# Patient Record
Sex: MALE | Race: WHITE | NOT HISPANIC OR LATINO | Employment: FULL TIME | URBAN - METROPOLITAN AREA
[De-identification: names, ages, dates, MRNs, and addresses within clinical notes are randomized per-mention and may not be internally consistent; named-entity substitution may affect disease eponyms.]

---

## 2019-03-12 ENCOUNTER — ALLIED HEALTH/NURSE VISIT (OUTPATIENT)
Dept: NURSING | Facility: CLINIC | Age: 29
End: 2019-03-12
Payer: COMMERCIAL

## 2019-03-12 DIAGNOSIS — Z53.9 DIAGNOSIS NOT YET DEFINED: Primary | ICD-10-CM

## 2019-03-12 NOTE — PROGRESS NOTES
SUBJECTIVE:   Yash Russell is a 28 year old male who presents to clinic today for the following health issues:    Travel immunizations.    After reviewing the patient history and consulting Dr. Roche. He advise that Vanna contact the travel clinics.   [facility] and [facility] number was provided to the patient.  No immunization administer today.        Screening performed by Cornelia Peck 3/12/2019 at 5:37 PM.

## 2019-09-18 ENCOUNTER — OFFICE VISIT (OUTPATIENT)
Dept: FAMILY MEDICINE | Facility: CLINIC | Age: 29
End: 2019-09-18
Payer: COMMERCIAL

## 2019-09-18 VITALS
WEIGHT: 194 LBS | HEART RATE: 72 BPM | SYSTOLIC BLOOD PRESSURE: 122 MMHG | DIASTOLIC BLOOD PRESSURE: 68 MMHG | BODY MASS INDEX: 23.62 KG/M2 | TEMPERATURE: 98.2 F | HEIGHT: 76 IN

## 2019-09-18 DIAGNOSIS — I82.621 DEEP VEIN THROMBOSIS (DVT) OF OTHER VEIN OF RIGHT UPPER EXTREMITY, UNSPECIFIED CHRONICITY (H): ICD-10-CM

## 2019-09-18 DIAGNOSIS — Z13.1 SCREENING FOR DIABETES MELLITUS: ICD-10-CM

## 2019-09-18 DIAGNOSIS — Z13.220 LIPID SCREENING: ICD-10-CM

## 2019-09-18 DIAGNOSIS — Z00.00 ROUTINE GENERAL MEDICAL EXAMINATION AT A HEALTH CARE FACILITY: Primary | ICD-10-CM

## 2019-09-18 DIAGNOSIS — H91.91 HEARING PROBLEM OF RIGHT EAR: ICD-10-CM

## 2019-09-18 PROCEDURE — 90471 IMMUNIZATION ADMIN: CPT | Performed by: FAMILY MEDICINE

## 2019-09-18 PROCEDURE — 99213 OFFICE O/P EST LOW 20 MIN: CPT | Mod: 25 | Performed by: FAMILY MEDICINE

## 2019-09-18 PROCEDURE — 99385 PREV VISIT NEW AGE 18-39: CPT | Mod: 25 | Performed by: FAMILY MEDICINE

## 2019-09-18 PROCEDURE — 90715 TDAP VACCINE 7 YRS/> IM: CPT | Performed by: FAMILY MEDICINE

## 2019-09-18 ASSESSMENT — ENCOUNTER SYMPTOMS
EYE PAIN: 0
HEADACHES: 0
WEAKNESS: 0
JOINT SWELLING: 0
FEVER: 0
DIARRHEA: 0
PALPITATIONS: 0
NERVOUS/ANXIOUS: 0
HEARTBURN: 0
SORE THROAT: 0
SHORTNESS OF BREATH: 0
FREQUENCY: 0
HEMATURIA: 0
NAUSEA: 0
DYSURIA: 0
HEMATOCHEZIA: 0
CONSTIPATION: 0
PARESTHESIAS: 0
MYALGIAS: 0
ARTHRALGIAS: 0
DIZZINESS: 0
ABDOMINAL PAIN: 0
CHILLS: 0
COUGH: 0

## 2019-09-18 ASSESSMENT — MIFFLIN-ST. JEOR: SCORE: 1937.51

## 2019-09-18 NOTE — PATIENT INSTRUCTIONS
Remember to follow up with the pharmacy in late September or early October to get your flu vaccine.    Tell your obstetrician-gynecologist that ou have gotten your TDAP today    Please schedule a follow-up appointment to get your FASTING blood work done in the near future. Please make sure you fast anywhere between 8-12 hours prior to your scheduled appointment    If hearing difficulties persist or worsen after about a month, follow-up with ENT or audiology. I have placed a referral for this.    If you have any follow-up questions or concerns regarding any of the topics discussed during today s visit, please feel free to get in contact with us. Your wellbeing is our priority :)      Preventive Health Recommendations  Male Ages 26 - 39    Yearly exam:             See your health care provider every year in order to  o   Review health changes.   o   Discuss preventive care.    o   Review your medicines if your doctor has prescribed any.    You should be tested each year for STDs (sexually transmitted diseases), if you re at risk.     After age 35, talk to your provider about cholesterol testing. If you are at risk for heart disease, have your cholesterol tested at least every 5 years.     If you are at risk for diabetes, you should have a diabetes test (fasting glucose).  Shots: Get a flu shot each year. Get a tetanus shot every 10 years.     Nutrition:    Eat at least 5 servings of fruits and vegetables daily.     Eat whole-grain bread, whole-wheat pasta and brown rice instead of white grains and rice.     Get adequate Calcium and Vitamin D.     Lifestyle    Exercise for at least 150 minutes a week (30 minutes a day, 5 days a week). This will help you control your weight and prevent disease.     Limit alcohol to one drink per day.     No smoking.     Wear sunscreen to prevent skin cancer.     See your dentist every six months for an exam and cleaning.

## 2019-09-18 NOTE — PROGRESS NOTES
SUBJECTIVE:   CC: Yash Russell is an 29 year old male who presents for preventative health visit.     Healthy Habits:     Getting at least 3 servings of Calcium per day:  NO    Bi-annual eye exam:  NO    Dental care twice a year:  Yes    Sleep apnea or symptoms of sleep apnea:  None    Diet:  Breakfast skipped    Frequency of exercise:  4-5 days/week    Duration of exercise:  30-45 minutes    Taking medications regularly:  Yes    Medication side effects:  None    PHQ-2 Total Score: 0    Additional concerns today:  No    TDAP updated today     Surgical history   Had first rib on the right side removed in the past due to blood clot. Was on blood thinners (pradaxa for 9 months ) for a bit.  Has a stent placed (2011) . He says he is currently asystematic.   Denies a family history of clots     Difficulty Hearing  Has hearing difficulty in right ear. He says he often misses a word or two.    Additional info  Smokes a cigar 1-2 a year  He says he is active. Denies chest pain on exertion or shortness of breath  cholesterol checked in 2013 and results were normal  Denies testicular tenderness, masses, or lesions.        Today's PHQ-2 Score:   PHQ-2 ( 1999 Pfizer) 9/18/2019   Q1: Little interest or pleasure in doing things 0   Q2: Feeling down, depressed or hopeless 0   PHQ-2 Score 0   Q1: Little interest or pleasure in doing things Not at all   Q2: Feeling down, depressed or hopeless Not at all   PHQ-2 Score 0       Abuse: Current or Past(Physical, Sexual or Emotional)- No  Do you feel safe in your environment? Yes    Social History     Tobacco Use     Smoking status: Never Smoker     Smokeless tobacco: Never Used   Substance Use Topics     Alcohol use: Yes     Comment: occasional         Alcohol Use 9/18/2019   Prescreen: >3 drinks/day or >7 drinks/week? No   Prescreen: >3 drinks/day or >7 drinks/week? -   No flowsheet data found.    Last PSA: No results found for: PSA    Reviewed orders with patient. Reviewed health  maintenance and updated orders accordingly - Yes  Labs reviewed in EPIC  BP Readings from Last 3 Encounters:   09/18/19 122/68   12/22/15 130/78   06/13/13 120/70    Wt Readings from Last 3 Encounters:   09/18/19 88 kg (194 lb)   12/22/15 89.8 kg (198 lb)   06/13/13 89.9 kg (198 lb 3.2 oz)                    Reviewed and updated as needed this visit by clinical staff  Tobacco  Allergies  Meds  Med Hx  Surg Hx  Fam Hx  Soc Hx        Reviewed and updated as needed this visit by Provider        Past Medical History:   Diagnosis Date     NO ACTIVE PROBLEMS      Thoracic outlet syndrome     first RIb gone on the right side-due to blood clot, 2011-stent in subclavian. treated with pradaxa for 9 months      Past Surgical History:   Procedure Laterality Date     SURGICAL HISTORY OF -       Tonsillectomy       Review of Systems   Constitutional: Negative for chills and fever.   HENT: Negative for congestion, ear pain, hearing loss and sore throat.    Eyes: Negative for pain and visual disturbance.   Respiratory: Negative for cough and shortness of breath.    Cardiovascular: Negative for chest pain, palpitations and peripheral edema.   Gastrointestinal: Negative for abdominal pain, constipation, diarrhea, heartburn, hematochezia and nausea.   Genitourinary: Negative for discharge, dysuria, frequency, genital sores, hematuria, impotence and urgency.   Musculoskeletal: Negative for arthralgias, joint swelling and myalgias.   Skin: Negative for rash.   Neurological: Negative for dizziness, weakness, headaches and paresthesias.   Psychiatric/Behavioral: Negative for mood changes. The patient is not nervous/anxious.      This document serves as a record of the services and decisions personally performed and made by Jens Islas DO. It was created on her behalf by Aranza Lundberg, a trained medical scribe. The creation of this document is based on the provider's statements to the medical scribe.  Aranza Lundberg 4:54 PM  "September 18, 2019      OBJECTIVE:   /68   Pulse 72   Temp 98.2  F (36.8  C) (Oral)   Ht 1.924 m (6' 3.75\")   Wt 88 kg (194 lb)   BMI 23.77 kg/m      Physical Exam  GENERAL: healthy, alert and no distress  EYES: Eyes grossly normal to inspection, PERRL and conjunctivae and sclerae normal  HENT: Fluid behind right ear  RESP: lungs clear to auscultation - no rales, rhonchi or wheezes  CV: regular rate and rhythm, normal S1 S2, no S3 or S4, no murmur, click or rub, no peripheral edema and peripheral pulses strong  ABDOMEN: soft, nontender, no hepatosplenomegaly, no masses and bowel sounds normal  MS: no gross musculoskeletal defects noted, no edema  SKIN: well healed surgical scar under axilla , no suspicious lesions or rashes  NEURO: Normal strength and tone, mentation intact and speech normal  PSYCH: mentation appears normal, affect normal/bright    Diagnostic Test Results:  Labs reviewed in Epic  No results found for this or any previous visit (from the past 24 hour(s)).    ASSESSMENT/PLAN:       ICD-10-CM    1. Routine general medical examination at a health care facility Z00.00 **Hemoglobin FUTURE anytime   2. Deep vein thrombosis (DVT) of other vein of right upper extremity, unspecified chronicity (H) I82.621 OFFICE/OUTPT VISIT,EST,LEVL III   3. Hearing problem of right ear H91.91 AUDIOLOGY ADULT REFERRAL     OTOLARYNGOLOGY REFERRAL     OFFICE/OUTPT VISIT,EST,LEVL III   4. Lipid screening Z13.220 Lipid panel reflex to direct LDL Fasting   5. Screening for diabetes mellitus Z13.1 **Basic metabolic panel FUTURE anytime         1. Routine general medical examination at a health care facility  Today's routine screening physical exam showed normal findings with the exception of  fluids behind right ear.    2. Deep vein thrombosis (DVT) of other vein of right upper extremity, unspecified chronicity (H)  S/P stent and treatment. Stable. No family history of clots. Had procedure done in 2011.     3. Hearing " "problem of right ear-trial of decongestants for now.   New, If symptoms persist or worsen, follow-up with ENT or audiology.   - AUDIOLOGY ADULT REFERRAL  - OTOLARYNGOLOGY REFERRAL    Wife delivering soon, tdap updated    COUNSELING:   Reviewed preventive health counseling, as reflected in patient instructions  Special attention given to:        Regular exercise       Healthy diet/nutrition       Immunizations    Vaccinated for: TDAP             Family planning    Estimated body mass index is 23.77 kg/m  as calculated from the following:    Height as of this encounter: 1.924 m (6' 3.75\").    Weight as of this encounter: 88 kg (194 lb).          reports that he has never smoked. He has never used smokeless tobacco.      Counseling Resources:  ATP IV Guidelines  Pooled Cohorts Equation Calculator  FRAX Risk Assessment  ICSI Preventive Guidelines  Dietary Guidelines for Americans, 2010  USDA's MyPlate  ASA Prophylaxis  Lung CA Screening     The information in this document, created by the medical scribe for me, accurately reflects the services I personally performed and the decisions made by me. I have reviewed and approved this document for accuracy prior to leaving the patient care area.  September 18, 2019 4:59 PM      Jens Islas DO  [facility]  "

## 2019-10-01 ENCOUNTER — HEALTH MAINTENANCE LETTER (OUTPATIENT)
Age: 29
End: 2019-10-01

## 2021-01-15 ENCOUNTER — HEALTH MAINTENANCE LETTER (OUTPATIENT)
Age: 31
End: 2021-01-15

## 2021-09-04 ENCOUNTER — HEALTH MAINTENANCE LETTER (OUTPATIENT)
Age: 31
End: 2021-09-04

## 2022-02-19 ENCOUNTER — HEALTH MAINTENANCE LETTER (OUTPATIENT)
Age: 32
End: 2022-02-19

## 2022-05-26 ENCOUNTER — VIRTUAL VISIT (OUTPATIENT)
Dept: FAMILY MEDICINE | Facility: CLINIC | Age: 32
End: 2022-05-26
Payer: COMMERCIAL

## 2022-05-26 DIAGNOSIS — Z11.59 NEED FOR HEPATITIS C SCREENING TEST: Primary | ICD-10-CM

## 2022-05-26 DIAGNOSIS — R50.9 FEVER, UNSPECIFIED FEVER CAUSE: ICD-10-CM

## 2022-05-26 PROCEDURE — 99213 OFFICE O/P EST LOW 20 MIN: CPT | Mod: 95 | Performed by: FAMILY MEDICINE

## 2022-05-26 NOTE — PROGRESS NOTES
Yash is a 31 year old who is being evaluated via a billable video visit.      How would you like to obtain your AVS? MyChart  If the video visit is dropped, the invitation should be resent by: Text to cell phone: [phone]  Will anyone else be joining your video visit? No    Video Start Time: 11:15 AM    Assessment & Plan       Fever, unspecified fever cause  The patient has typical COVID symptoms.  I want him to stay out of work for a full 5 days and then return day 6 and asked for the next 10.  Therefore I do not recommend he go back to work tomorrow but Monday instead.  I have ordered testing for COVID and influenza  - Symptomatic; Yes; 5/23/2022 COVID-19 Virus (Coronavirus) by PCR  - Influenza A/B antigen      20 minutes spent on the date of the encounter doing chart review, history and exam, documentation and further activities per the note        No follow-ups on file.    Marycarmen Samuel DO  [facility]   Yash is a 31 year old who presents for the following health issues     HPI     Would like letter emailed to: [email or web address]  Missed work Tues, Wed, Thurs-- would like to return tomorrow if possible    Acute Illness  Acute illness concerns: Cold symptoms  Onset/Duration:  Monday (4 days)  Symptoms:  Fever: yes   Chills/Sweats: yes  Headache (location?): YES  Sinus Pressure: YES  Conjunctivitis:  no  Ear Pain: no  Rhinorrhea: YES  Congestion: YES  Sore Throat: YES  Cough: YES-productive of clear sputum  Wheeze: YES after coughing fit  Decreased Appetite: no  Nausea: no  Vomiting: no  Diarrhea: no  Dysuria/Freq.: no  Dysuria or Hematuria: no  Fatigue/Achiness: YES  Sick/Strep Exposure: YES- whole family is sick with viral colds  Therapies tried and outcome:  OTC meds-- negative home covid test yesterday    Kids ages 2 and 1 with cough and cold.  Baby slept 16 hours last night.  The kids had fevers up to 103.   Wife cough, nasal congestion and fever     Review of  Systems   Constitutional, HEENT, cardiovascular, pulmonary, gi and gu systems are negative, except as otherwise noted.      Objective           Vitals:  No vitals were obtained today due to virtual visit.    Physical Exam   GENERAL: alert, no distress and fatigued  EYES: Eyes grossly normal to inspection.  No discharge or erythema, or obvious scleral/conjunctival abnormalities.  RESP: No audible wheeze, cough, or visible cyanosis.  No visible retractions or increased work of breathing.    SKIN: Visible skin clear. No significant rash, abnormal pigmentation or lesions.  NEURO: Cranial nerves grossly intact.  Mentation and speech appropriate for age.  PSYCH: Mentation appears normal, affect normal/bright, judgement and insight intact, normal speech and appearance well-groomed.            Video-Visit Details    Type of service:  Video Visit    Video End Time:11:30 AM    Originating Location (pt. Location): Home    Distant Location (provider location):  [facility]     Platform used for Video Visit: LoopMe

## 2022-05-26 NOTE — LETTER
[facility]  [address] [address]  Phone: [phone]    May 26, 2022        Yash Russell  [address] [address]          To whom it may concern:    RE: Yash Russell    Patient was seen and treated today at our clinic and missed work May 23 through May 27, 2022.  He is okay to return to work May 30.    Please contact me for questions or concerns.      Sincerely,          Marycarmen Samuel, DO

## 2022-10-16 ENCOUNTER — HEALTH MAINTENANCE LETTER (OUTPATIENT)
Age: 32
End: 2022-10-16

## 2022-12-21 ASSESSMENT — ENCOUNTER SYMPTOMS
NERVOUS/ANXIOUS: 0
JOINT SWELLING: 0
SORE THROAT: 0
ARTHRALGIAS: 0
MYALGIAS: 0
FEVER: 0
NAUSEA: 0
ABDOMINAL PAIN: 0
WEAKNESS: 0
CONSTIPATION: 0
HEMATOCHEZIA: 0
DYSURIA: 0
HEMATURIA: 0
EYE PAIN: 0
DIARRHEA: 0
CHILLS: 0
HEARTBURN: 0
FREQUENCY: 0
PARESTHESIAS: 0
HEADACHES: 0
COUGH: 0
DIZZINESS: 0
SHORTNESS OF BREATH: 0
PALPITATIONS: 0

## 2022-12-28 ENCOUNTER — OFFICE VISIT (OUTPATIENT)
Dept: FAMILY MEDICINE | Facility: CLINIC | Age: 32
End: 2022-12-28
Payer: COMMERCIAL

## 2022-12-28 VITALS
WEIGHT: 202.2 LBS | TEMPERATURE: 98 F | HEART RATE: 76 BPM | SYSTOLIC BLOOD PRESSURE: 127 MMHG | OXYGEN SATURATION: 98 % | HEIGHT: 75 IN | BODY MASS INDEX: 25.14 KG/M2 | RESPIRATION RATE: 14 BRPM | DIASTOLIC BLOOD PRESSURE: 80 MMHG

## 2022-12-28 DIAGNOSIS — Z23 NEED FOR IMMUNIZATION AGAINST INFLUENZA: ICD-10-CM

## 2022-12-28 DIAGNOSIS — Z23 HIGH PRIORITY FOR 2019-NCOV VACCINE: ICD-10-CM

## 2022-12-28 DIAGNOSIS — Z11.59 NEED FOR HEPATITIS C SCREENING TEST: ICD-10-CM

## 2022-12-28 DIAGNOSIS — Z00.00 ROUTINE GENERAL MEDICAL EXAMINATION AT A HEALTH CARE FACILITY: Primary | ICD-10-CM

## 2022-12-28 DIAGNOSIS — R73.09 ELEVATED GLUCOSE: ICD-10-CM

## 2022-12-28 DIAGNOSIS — G54.0 THORACIC OUTLET SYNDROME: ICD-10-CM

## 2022-12-28 DIAGNOSIS — Z13.6 CARDIOVASCULAR SCREENING; LDL GOAL LESS THAN 160: ICD-10-CM

## 2022-12-28 PROCEDURE — 0134A COVID-19 VACCINE BIVALENT BOOSTER 18+ (MODERNA): CPT | Performed by: NURSE PRACTITIONER

## 2022-12-28 PROCEDURE — 91313 COVID-19 VACCINE BIVALENT BOOSTER 18+ (MODERNA): CPT | Performed by: NURSE PRACTITIONER

## 2022-12-28 PROCEDURE — 82947 ASSAY GLUCOSE BLOOD QUANT: CPT | Performed by: NURSE PRACTITIONER

## 2022-12-28 PROCEDURE — 80061 LIPID PANEL: CPT | Performed by: NURSE PRACTITIONER

## 2022-12-28 PROCEDURE — 90686 IIV4 VACC NO PRSV 0.5 ML IM: CPT | Performed by: NURSE PRACTITIONER

## 2022-12-28 PROCEDURE — 36415 COLL VENOUS BLD VENIPUNCTURE: CPT | Performed by: NURSE PRACTITIONER

## 2022-12-28 PROCEDURE — 99395 PREV VISIT EST AGE 18-39: CPT | Mod: 25 | Performed by: NURSE PRACTITIONER

## 2022-12-28 PROCEDURE — 90471 IMMUNIZATION ADMIN: CPT | Performed by: NURSE PRACTITIONER

## 2022-12-28 PROCEDURE — 86803 HEPATITIS C AB TEST: CPT | Performed by: NURSE PRACTITIONER

## 2022-12-28 RX ORDER — CX-024414 0.2 MG/ML
INJECTION, SUSPENSION INTRAMUSCULAR
COMMUNITY
Start: 2022-01-04

## 2022-12-28 ASSESSMENT — ENCOUNTER SYMPTOMS
SHORTNESS OF BREATH: 0
NAUSEA: 0
SORE THROAT: 0
ABDOMINAL PAIN: 0
FEVER: 0
PARESTHESIAS: 0
FREQUENCY: 0
WEAKNESS: 0
EYE PAIN: 0
CHILLS: 0
HEMATOCHEZIA: 0
DIARRHEA: 0
DIZZINESS: 0
COUGH: 0
MYALGIAS: 0
DYSURIA: 0
NERVOUS/ANXIOUS: 0
HEARTBURN: 0
PALPITATIONS: 0
ARTHRALGIAS: 0
JOINT SWELLING: 0
CONSTIPATION: 0
HEADACHES: 0
HEMATURIA: 0

## 2022-12-28 ASSESSMENT — PAIN SCALES - GENERAL: PAINLEVEL: NO PAIN (0)

## 2022-12-28 NOTE — PROGRESS NOTES
SUBJECTIVE:   CC: Yash is an 32 year old who presents for preventative health visit.   Patient has been advised of split billing requirements and indicates understanding: Yes     Patient is a  [facility], has not had health care for about 9 years  And has decided to take his health more seriously now that he has children.    Healthy Habits:     Getting at least 3 servings of Calcium per day:  Yes    Bi-annual eye exam:  NO    Dental care twice a year:  Yes    Sleep apnea or symptoms of sleep apnea:  None    Diet:  Regular (no restrictions)    Duration of exercise:  N/A    Taking medications regularly:  Not Applicable    Medication side effects:  Not applicable    PHQ-2 Total Score: 0    Additional concerns today:  No        Today's PHQ-2 Score:   PHQ-2 ( 1999 Pfizer) 12/21/2022   Q1: Little interest or pleasure in doing things 0   Q2: Feeling down, depressed or hopeless 0   PHQ-2 Score 0   PHQ-2 Total Score (12-17 Years)- Positive if 3 or more points; Administer PHQ-A if positive -   Q1: Little interest or pleasure in doing things Not at all   Q2: Feeling down, depressed or hopeless Not at all   PHQ-2 Score 0       Have you ever done Advance Care Planning? (For example, a Health Directive, POLST, or a discussion with a medical provider or your loved ones about your wishes): Yes, patient states has an Advance Care Planning document and will bring a copy to the clinic.    Social History     Tobacco Use     Smoking status: Never     Smokeless tobacco: Never   Substance Use Topics     Alcohol use: Yes     Comment: occasional     If you drink alcohol do you typically have >3 drinks per day or >7 drinks per week? No    Alcohol Use 12/28/2022   Prescreen: >3 drinks/day or >7 drinks/week? -   Prescreen: >3 drinks/day or >7 drinks/week? No       Last PSA: No results found for: PSA    Reviewed orders with patient. Reviewed health maintenance and updated orders accordingly - Yes  Labs reviewed in EPIC  BP  Readings from Last 3 Encounters:   12/28/22 127/80   09/18/19 122/68   12/22/15 130/78    Wt Readings from Last 3 Encounters:   12/28/22 91.7 kg (202 lb 3.2 oz)   09/18/19 88 kg (194 lb)   12/22/15 89.8 kg (198 lb)                  Patient Active Problem List   Diagnosis     CARDIOVASCULAR SCREENING; LDL GOAL LESS THAN 160     Gilbert syndrome     RT Thoracic outlet syndrome s/p surgical release     RT Subclavian DVT secondary to thoracic outlet     24 hour clinic contact list given     Past Surgical History:   Procedure Laterality Date     SURGICAL HISTORY OF -       Tonsillectomy     THORACIC SURGERY  2010    First rib removed right side       Social History     Tobacco Use     Smoking status: Never     Smokeless tobacco: Never   Substance Use Topics     Alcohol use: Yes     Comment: occasional     Family History   Problem Relation Age of Onset     Hyperlipidemia Paternal Grandfather      Coronary Artery Disease Paternal Grandfather      Family History Negative No family hx of      Asthma No family hx of      Cerebrovascular Disease No family hx of      Colon Cancer No family hx of      Hypertension No family hx of      Thyroid Disease No family hx of      Osteoporosis No family hx of            Reviewed and updated as needed this visit by clinical staff   Tobacco  Allergies  Meds     [facility] Hx          Reviewed and updated as needed this visit by Provider         [facility] Hx         Past Medical History:   Diagnosis Date     NO ACTIVE PROBLEMS      Thoracic outlet syndrome     first RIb gone on the right side-due to blood clot, 2011-stent in subclavian. treated with pradaxa for 9 months      Past Surgical History:   Procedure Laterality Date     SURGICAL HISTORY OF -       Tonsillectomy     THORACIC SURGERY  2010    First rib removed right side       Review of Systems   Constitutional: Negative for chills and fever.   HENT: Negative for congestion, ear pain, hearing loss and sore throat.    Eyes: Negative for pain  "and visual disturbance.   Respiratory: Negative for cough and shortness of breath.    Cardiovascular: Negative for chest pain, palpitations and peripheral edema.   Gastrointestinal: Negative for abdominal pain, constipation, diarrhea, heartburn, hematochezia and nausea.   Genitourinary: Negative for dysuria, frequency, genital sores, hematuria, impotence, penile discharge and urgency.   Musculoskeletal: Negative for arthralgias, joint swelling and myalgias.   Skin: Negative for rash.   Neurological: Negative for dizziness, weakness, headaches and paresthesias.   Psychiatric/Behavioral: Negative for mood changes. The patient is not nervous/anxious.      CONSTITUTIONAL: NEGATIVE for fever, chills, change in weight  INTEGUMENTARY/SKIN: NEGATIVE for worrisome rashes, moles or lesions  EYES: NEGATIVE for vision changes or irritation  ENT: NEGATIVE for ear, mouth and throat problems  RESP: NEGATIVE for significant cough or SOB  CV: NEGATIVE for chest pain, palpitations or peripheral edema  GI: NEGATIVE for nausea, abdominal pain, heartburn, or change in bowel habits   male: negative for dysuria, hematuria, decreased urinary stream, erectile dysfunction, urethral discharge  MUSCULOSKELETAL: NEGATIVE for significant arthralgias or myalgia  NEURO: NEGATIVE for weakness, dizziness or paresthesias  PSYCHIATRIC: NEGATIVE for changes in mood or affect    OBJECTIVE:   /80 (BP Location: Left arm, Patient Position: Sitting, Cuff Size: Adult Large)   Pulse 76   Temp 98  F (36.7  C) (Oral)   Resp 14   Ht 1.905 m (6' 3\")   Wt 91.7 kg (202 lb 3.2 oz)   SpO2 98%   BMI 25.27 kg/m      Physical Exam  GENERAL: healthy, alert and no distress  EYES: Eyes grossly normal to inspection, PERRL and conjunctivae and sclerae normal  HENT: ear canals and TM's normal, nose and mouth without ulcers or lesions  NECK: no adenopathy, no asymmetry, masses, or scars and thyroid normal to palpation  RESP: lungs clear to auscultation - no " rales, rhonchi or wheezes  CV: regular rate and rhythm, normal S1 S2, no S3 or S4, no murmur, click or rub, no peripheral edema and peripheral pulses strong  ABDOMEN: soft, nontender, no hepatosplenomegaly, no masses and bowel sounds normal   (male): normal male genitalia without lesions or urethral discharge, no hernia  MS: no gross musculoskeletal defects noted, no edema  SKIN: no suspicious lesions or rashes  NEURO: Normal strength and tone, mentation intact and speech normal  PSYCH: mentation appears normal, affect normal/bright    Diagnostic Test Results:  Labs reviewed in Epic  No results found for this or any previous visit (from the past 24 hour(s)).    ASSESSMENT/PLAN:   (Z00.00) Routine general medical examination at a health care facility  (primary encounter diagnosis)  Comment:   Plan: REVIEW OF HEALTH MAINTENANCE PROTOCOL ORDERS,         Glucose            (G54.0) RT Thoracic outlet syndrome s/p surgical release  Comment: he was anticoagulated X 3 months, has had no recurrent symptoms since.  Plan: s/p surgery 2010      (Z11.59) Need for hepatitis C screening test  Comment: =]  Plan: Hepatitis C Screen Reflex to HCV RNA Quant and         Genotype            (Z13.6) CARDIOVASCULAR SCREENING; LDL GOAL LESS THAN 160  Comment:   Plan: Lipid panel reflex to direct LDL Non-fasting            (Z23) Need for immunization against influenza  Comment:   Plan: INFLUENZA VACCINE IM > 6 MONTHS VALENT IIV4         (AFLURIA/FLUZONE)            (Z23) High priority for 2019-nCoV vaccine  Comment:   Plan: COVID-19,PF,MODERNA BIVALENT 18+Yrs              Patient has been advised of split billing requirements and indicates understanding: Yes      COUNSELING:   Reviewed preventive health counseling, as reflected in patient instructions        He reports that he has never smoked. He has never used smokeless tobacco.        Deborah Butler, [facility]

## 2022-12-28 NOTE — NURSING NOTE
Prior to immunization administration, verified patients identity using patient s name and date of birth. Please see Immunization Activity for additional information.     Screening Questionnaire for Adult Immunization    Are you sick today?   No   Do you have allergies to medications, food, a vaccine component or latex?   No   Have you ever had a serious reaction after receiving a vaccination?   No   Do you have a long-term health problem with heart, lung, kidney, or metabolic disease (e.g., diabetes), asthma, a blood disorder, no spleen, complement component deficiency, a cochlear implant, or a spinal fluid leak?  Are you on long-term aspirin therapy?   No   Do you have cancer, leukemia, HIV/AIDS, or any other immune system problem?   No   Do you have a parent, brother, or sister with an immune system problem?   No   In the past 3 months, have you taken medications that affect  your immune system, such as prednisone, other steroids, or anticancer drugs; drugs for the treatment of rheumatoid arthritis, Crohn s disease, or psoriasis; or have you had radiation treatments?   No   Have you had a seizure, or a brain or other nervous system problem?   No   During the past year, have you received a transfusion of blood or blood    products, or been given immune (gamma) globulin or antiviral drug?   No   For women: Are you pregnant or is there a chance you could become       pregnant during the next month?   No   Have you received any vaccinations in the past 4 weeks?   No     Immunization questionnaire answers were all negative.        Per orders of NIRAV Salcedo CNP, injection of Moderna bivalent and Fluzone given by Renata Schilling RN. Patient instructed to remain in clinic for 15 minutes afterwards, and to report any adverse reaction to me immediately.       Screening performed by Renata Schilling RN on 12/28/2022 at 5:05 PM.

## 2022-12-29 LAB
CHOLEST SERPL-MCNC: 250 MG/DL
FASTING STATUS PATIENT QL REPORTED: NO
FASTING STATUS PATIENT QL REPORTED: NO
GLUCOSE BLD-MCNC: 112 MG/DL (ref 70–99)
HCV AB SERPL QL IA: NONREACTIVE
HDLC SERPL-MCNC: 63 MG/DL
LDLC SERPL CALC-MCNC: 130 MG/DL
NONHDLC SERPL-MCNC: 187 MG/DL
TRIGL SERPL-MCNC: 286 MG/DL

## 2022-12-30 ENCOUNTER — MYC MEDICAL ADVICE (OUTPATIENT)
Dept: FAMILY MEDICINE | Facility: CLINIC | Age: 32
End: 2022-12-30

## 2022-12-30 NOTE — TELEPHONE ENCOUNTER
Routing to provider.    Pt is looking for further advisement on on labs that were done on 12/28/22.      Michelle Nuñez RN  [facility]

## 2022-12-31 ENCOUNTER — MYC MEDICAL ADVICE (OUTPATIENT)
Dept: FAMILY MEDICINE | Facility: CLINIC | Age: 32
End: 2022-12-31

## 2023-01-06 ENCOUNTER — LAB (OUTPATIENT)
Dept: LAB | Facility: CLINIC | Age: 33
End: 2023-01-06
Payer: COMMERCIAL

## 2023-01-06 DIAGNOSIS — R73.09 ELEVATED GLUCOSE: ICD-10-CM

## 2023-01-06 DIAGNOSIS — Z13.6 CARDIOVASCULAR SCREENING; LDL GOAL LESS THAN 160: ICD-10-CM

## 2023-01-06 LAB
CHOLEST SERPL-MCNC: 201 MG/DL
FASTING STATUS PATIENT QL REPORTED: YES
GLUCOSE SERPL-MCNC: 81 MG/DL (ref 70–99)
HDLC SERPL-MCNC: 50 MG/DL
LDLC SERPL CALC-MCNC: 137 MG/DL
NONHDLC SERPL-MCNC: 151 MG/DL
TRIGL SERPL-MCNC: 69 MG/DL

## 2023-01-06 PROCEDURE — 80061 LIPID PANEL: CPT

## 2023-01-06 PROCEDURE — 36415 COLL VENOUS BLD VENIPUNCTURE: CPT

## 2023-01-06 PROCEDURE — 82947 ASSAY GLUCOSE BLOOD QUANT: CPT

## 2023-09-22 ENCOUNTER — DOCUMENTATION ONLY (OUTPATIENT)
Dept: EMERGENCY MEDICINE | Facility: CLINIC | Age: 33
End: 2023-09-22

## 2023-09-22 NOTE — PROGRESS NOTES
This patient has a future lab only appointment and would like to have a lipid profile ordered. Please send orders. Thanks [facility]

## 2023-09-27 ENCOUNTER — DOCUMENTATION ONLY (OUTPATIENT)
Dept: EMERGENCY MEDICINE | Facility: CLINIC | Age: 33
End: 2023-09-27

## 2023-09-27 NOTE — PROGRESS NOTES
This patient has a future lab only appointment and needs orders. They would like to have their lipid profile checked. Please send orders. Thanks [facility]

## 2023-09-28 ENCOUNTER — DOCUMENTATION ONLY (OUTPATIENT)
Dept: FAMILY MEDICINE | Facility: CLINIC | Age: 33
End: 2023-09-28
Payer: COMMERCIAL

## 2023-09-28 DIAGNOSIS — Z13.6 CARDIOVASCULAR SCREENING; LDL GOAL LESS THAN 160: Primary | ICD-10-CM

## 2023-09-28 NOTE — PROGRESS NOTES
This patient has a future lab only appointment and needs orders. He would like to have his Lipid profile done. Please send orders. Thanks [facility]

## 2023-10-02 ENCOUNTER — DOCUMENTATION ONLY (OUTPATIENT)
Dept: EMERGENCY MEDICINE | Facility: CLINIC | Age: 33
End: 2023-10-02

## 2023-10-02 NOTE — PROGRESS NOTES
This patient has a future lab only appointment and would like a lipid profile. Please send orders. Thanks [facility]

## 2023-10-03 ENCOUNTER — MYC MEDICAL ADVICE (OUTPATIENT)
Dept: FAMILY MEDICINE | Facility: CLINIC | Age: 33
End: 2023-10-03

## 2023-10-03 ENCOUNTER — LAB (OUTPATIENT)
Dept: LAB | Facility: CLINIC | Age: 33
End: 2023-10-03
Payer: COMMERCIAL

## 2023-10-03 DIAGNOSIS — Z13.6 CARDIOVASCULAR SCREENING; LDL GOAL LESS THAN 160: ICD-10-CM

## 2023-10-03 LAB
CHOLEST SERPL-MCNC: 240 MG/DL
HDLC SERPL-MCNC: 64 MG/DL
LDLC SERPL CALC-MCNC: 155 MG/DL
NONHDLC SERPL-MCNC: 176 MG/DL
TRIGL SERPL-MCNC: 104 MG/DL

## 2023-10-03 PROCEDURE — 80061 LIPID PANEL: CPT

## 2023-10-03 PROCEDURE — 36415 COLL VENOUS BLD VENIPUNCTURE: CPT

## 2023-10-04 NOTE — TELEPHONE ENCOUNTER
[facility] and wanting to know what to do next.    Routing to provider to review labs from 10/03.    Merle MULLIGAN RN, BSN  [facility]

## 2024-01-04 ENCOUNTER — OFFICE VISIT (OUTPATIENT)
Dept: FAMILY MEDICINE | Facility: CLINIC | Age: 34
End: 2024-01-04
Payer: COMMERCIAL

## 2024-01-04 VITALS
HEART RATE: 63 BPM | TEMPERATURE: 97.6 F | HEIGHT: 75 IN | RESPIRATION RATE: 24 BRPM | BODY MASS INDEX: 23.85 KG/M2 | WEIGHT: 191.8 LBS | OXYGEN SATURATION: 99 % | DIASTOLIC BLOOD PRESSURE: 75 MMHG | SYSTOLIC BLOOD PRESSURE: 113 MMHG

## 2024-01-04 DIAGNOSIS — Z00.00 ROUTINE GENERAL MEDICAL EXAMINATION AT A HEALTH CARE FACILITY: Primary | ICD-10-CM

## 2024-01-04 PROCEDURE — 91320 SARSCV2 VAC 30MCG TRS-SUC IM: CPT | Performed by: NURSE PRACTITIONER

## 2024-01-04 PROCEDURE — 90480 ADMN SARSCOV2 VAC 1/ONLY CMP: CPT | Performed by: NURSE PRACTITIONER

## 2024-01-04 PROCEDURE — 99395 PREV VISIT EST AGE 18-39: CPT | Mod: 25 | Performed by: NURSE PRACTITIONER

## 2024-01-04 ASSESSMENT — ENCOUNTER SYMPTOMS
EYE PAIN: 0
ARTHRALGIAS: 0
MYALGIAS: 0
PARESTHESIAS: 0
FREQUENCY: 0
HEADACHES: 0
DYSURIA: 0
HEMATOCHEZIA: 0
DIARRHEA: 0
CONSTIPATION: 0
NERVOUS/ANXIOUS: 0
PALPITATIONS: 0
WEAKNESS: 0
HEMATURIA: 0
JOINT SWELLING: 0
COUGH: 1
SORE THROAT: 0
CHILLS: 0
DIZZINESS: 0
ABDOMINAL PAIN: 0
HEARTBURN: 0
SHORTNESS OF BREATH: 0
NAUSEA: 0
FEVER: 0

## 2024-01-04 ASSESSMENT — PAIN SCALES - GENERAL: PAINLEVEL: NO PAIN (0)

## 2024-01-04 NOTE — PATIENT INSTRUCTIONS
"Ways to improve your cholesterol...     1- Eats less saturated fats (including avoiding \"trans\" fats).     2 - Eat more unsaturated fats  - found in vegetables, grains, and tree nuts.  Also by replacing butter with canola oil or olive oil.     3 - Eat more nuts.  1-2 ounces (a small handful) of almonds, walnuts, hazelnuts or pecans once a day in place of other less healthy snacks.     4 - Eat more high fiber foods - vegetables and whole grains including oat bran, oats, beans, peas, and flax seed.     5 - Eat more fish - such as salmon, tuna, mackerel, and sardines.  1 or 2 six ounce servings per week is a healthy replacement for other proteins.     6 - Exercise for at least 120 minutes per week - which is equal to 30 minutes 4 days per week.      Preventive Health Recommendations  Male Ages 26 - 39    Yearly exam:             See your health care provider every year in order to  o   Review health changes.   o   Discuss preventive care.    o   Review your medicines if your doctor has prescribed any.  You should be tested each year for STDs (sexually transmitted diseases), if you re at risk.   After age 35, talk to your provider about cholesterol testing. If you are at risk for heart disease, have your cholesterol tested at least every 5 years.   If you are at risk for diabetes, you should have a diabetes test (fasting glucose).  Shots: Get a flu shot each year. Get a tetanus shot every 10 years.     Nutrition:  Eat at least 5 servings of fruits and vegetables daily.   Eat whole-grain bread, whole-wheat pasta and brown rice instead of white grains and rice.   Get adequate Calcium and Vitamin D.     Lifestyle  Exercise for at least 150 minutes a week (30 minutes a day, 5 days a week). This will help you control your weight and prevent disease.   Limit alcohol to one drink per day.   No smoking.   Wear sunscreen to prevent skin cancer.   See your dentist every six months for an exam and cleaning.     "

## 2024-01-04 NOTE — PROGRESS NOTES
SUBJECTIVE:   Yannick is a 33 year old, presenting for the following:  Physical        1/4/2024     7:00 AM   Additional Questions   Roomed by Gregoria   Accompanied by none         1/4/2024     7:00 AM   Patient Reported Additional Medications   Patient reports taking the following new medications ASA 81 mg       Healthy Habits:     Getting at least 3 servings of Calcium per day:  NO    Bi-annual eye exam:  NO    Dental care twice a year:  Yes    Sleep apnea or symptoms of sleep apnea:  None    Diet:  Regular (no restrictions)    Frequency of exercise:  2-3 days/week    Duration of exercise:  15-30 minutes    Taking medications regularly:  Yes    Medication side effects:  Not applicable    Additional concerns today:  Yes          Would like to discuss medication for elevated cholesterol levels     Additional provider notes: Patient presents in clinic for annual physical.     HLD: dad/uncle on statins. He exercises 2-3x/week. Diet isn't the best, but isn't horrible.       Cholesterol   Date Value Ref Range Status   10/03/2023 240 (H) <200 mg/dL Final   01/06/2023 201 (H) <200 mg/dL Final   02/26/2013 217 (H) 0 - 200 mg/dL Final     Comment:     LDL Cholesterol is the primary guide to therapy.   The NCEP recommends further evaluation of: patients with cholesterol greater   than 200 mg/dL if additional risk factors are present, cholesterol greater   than   240 mg/dL, triglycerides greater than 150 mg/dL, or HDL less than 40 mg/dL.   12/30/2010 214 (H) 0 - 200 mg/dL Final     Comment:     LDL Cholesterol is the primary guide to therapy.     The NCEP recommends further evaluation of: patients with cholesterol <200   mg/dL   if additional risk factors are present, cholesterol >240 mg/dL, triglycerides   >150 mg/dL, or HDL <40 mg/dL.     HDL Cholesterol   Date Value Ref Range Status   02/26/2013 64 40 - 110 mg/dL Final   12/30/2010 58 40 - 110 mg/dL Final     Direct Measure HDL   Date Value Ref Range Status   10/03/2023 64 >=40  "mg/dL Final   01/06/2023 50 >=40 mg/dL Final     LDL Cholesterol Calculated   Date Value Ref Range Status   10/03/2023 155 (H) <=100 mg/dL Final   01/06/2023 137 (H) <=100 mg/dL Final   02/26/2013 133 (H) 0 - 129 mg/dL Final     Comment:     LDL Cholesterol is the primary guide to therapy: LDL-cholesterol goal in high   risk patients is <100 mg/dL and in very high risk patients is <70 mg/dL.   12/30/2010 126 0 - 129 mg/dL Final     Comment:     LDL Cholesterol is the primary guide to therapy: LDL-cholesterol goal in high   risk patients is <100 mg/dL and in very high risk patients is <70 mg/dL.     Triglycerides   Date Value Ref Range Status   10/03/2023 104 <150 mg/dL Final   01/06/2023 69 <150 mg/dL Final   02/26/2013 99 0 - 150 mg/dL Final   12/30/2010 149 0 - 150 mg/dL Final     Cholesterol/HDL Ratio   Date Value Ref Range Status   02/26/2013 3.0 0.0 - 5.0 Final   12/30/2010 3.7 0.0 - 5.0 Final        No Known Allergies    Current Outpatient Medications   Medication    MODERNA COVID-19 VACCINE 100 MCG/0.5ML injection    NO ACTIVE MEDICATIONS     No current facility-administered medications for this visit.       Past Medical History:   Diagnosis Date    NO ACTIVE PROBLEMS     Thoracic outlet syndrome     first RIb gone on the right side-due to blood clot, 2011-stent in subclavian. treated with pradaxa for 9 months            Social History     Tobacco Use    Smoking status: Never     Passive exposure: Never    Smokeless tobacco: Never   Substance Use Topics    Alcohol use: Yes     Comment: occasional             1/4/2024     6:46 AM   Alcohol Use   Prescreen: >3 drinks/day or >7 drinks/week? No       Last PSA: No results found for: \"PSA\"    Reviewed orders with patient. Reviewed health maintenance and updated orders accordingly - Yes      Reviewed and updated as needed this visit by clinical staff   Tobacco  Allergies  Meds              Reviewed and updated as needed this visit by Provider                 Past " "Medical History:   Diagnosis Date    NO ACTIVE PROBLEMS     Thoracic outlet syndrome     first RIb gone on the right side-due to blood clot, 2011-stent in subclavian. treated with pradaxa for 9 months      Past Surgical History:   Procedure Laterality Date    SURGICAL HISTORY OF -       Tonsillectomy    THORACIC SURGERY  2010    First rib removed right side       Review of Systems   Constitutional:  Negative for chills and fever.   HENT:  Negative for congestion, ear pain, hearing loss and sore throat.    Eyes:  Negative for pain and visual disturbance.   Respiratory:  Positive for cough. Negative for shortness of breath.    Cardiovascular:  Negative for chest pain, palpitations and peripheral edema.   Gastrointestinal:  Negative for abdominal pain, constipation, diarrhea, heartburn, hematochezia and nausea.   Genitourinary:  Negative for dysuria, frequency, genital sores, hematuria, impotence, penile discharge and urgency.   Musculoskeletal:  Negative for arthralgias, joint swelling and myalgias.   Skin:  Negative for rash.   Neurological:  Negative for dizziness, weakness, headaches and paresthesias.   Psychiatric/Behavioral:  Negative for mood changes. The patient is not nervous/anxious.          OBJECTIVE:   /75 (BP Location: Right arm, Patient Position: Sitting, Cuff Size: Adult Regular)   Pulse 63   Temp 97.6  F (36.4  C) (Oral)   Resp 24   Ht 1.905 m (6' 3\")   Wt 87 kg (191 lb 12.8 oz)   SpO2 99%   BMI 23.97 kg/m      Physical Exam  GENERAL: healthy, alert and no distress  EYES: Eyes grossly normal to inspection, PERRL and conjunctivae and sclerae normal  HENT: ear canals and TM's normal, nose and mouth without ulcers or lesions  NECK: no adenopathy, no asymmetry, masses, or scars and thyroid normal to palpation  RESP: lungs clear to auscultation - no rales, rhonchi or wheezes  CV: regular rate and rhythm, normal S1 S2, no S3 or S4, no murmur, click or rub, no peripheral edema and peripheral " pulses strong  ABDOMEN: soft, nontender, no hepatosplenomegaly, no masses and bowel sounds normal  MS: no gross musculoskeletal defects noted, no edema  SKIN: no suspicious lesions or rashes  NEURO: Normal strength and tone, mentation intact and speech normal  PSYCH: mentation appears normal, affect normal/bright    Diagnostic Test Results:  Labs reviewed in Epic    ASSESSMENT/PLAN:       ICD-10-CM    1. Routine general medical examination at a health care facility  Z00.00       -no labs done today - he had them done in October  -HLD: will monitor in the future to make sure LDL doesn't keep going up. He has a lot of family members on statins. He does not yet meet criteria for statin therapy, but will monitor yearly.     Patient has been advised of split billing requirements and indicates understanding: No      COUNSELING:   Reviewed preventive health counseling, as reflected in patient instructions       Regular exercise       Healthy diet/nutrition       Vision screening       Immunizations  Vaccinated for: Covid-19          He reports that he has never smoked. He has never been exposed to tobacco smoke. He has never used smokeless tobacco.          Magali Arce DNP  [facility]

## 2024-01-04 NOTE — NURSING NOTE
Prior to immunization administration, verified patients identity using patient s name and date of birth. Please see Immunization Activity for additional information.     Screening Questionnaire for Adult Immunization    Are you sick today?   No   Do you have allergies to medications, food, a vaccine component or latex?   No   Have you ever had a serious reaction after receiving a vaccination?   No   Do you have a long-term health problem with heart, lung, kidney, or metabolic disease (e.g., diabetes), asthma, a blood disorder, no spleen, complement component deficiency, a cochlear implant, or a spinal fluid leak?  Are you on long-term aspirin therapy?   No   Do you have cancer, leukemia, HIV/AIDS, or any other immune system problem?   No   Do you have a parent, brother, or sister with an immune system problem?   No   In the past 3 months, have you taken medications that affect  your immune system, such as prednisone, other steroids, or anticancer drugs; drugs for the treatment of rheumatoid arthritis, Crohn s disease, or psoriasis; or have you had radiation treatments?   No   Have you had a seizure, or a brain or other nervous system problem?   No   During the past year, have you received a transfusion of blood or blood    products, or been given immune (gamma) globulin or antiviral drug?   No   For women: Are you pregnant or is there a chance you could become       pregnant during the next month?   No   Have you received any vaccinations in the past 4 weeks?   No     Immunization questionnaire answers were all negative.      Patient instructed to remain in clinic for 15 minutes afterwards, and to report any adverse reactions.     Screening performed by Gregoria Carter MA on 1/4/2024 at 7:02 AM.

## 2024-09-12 ENCOUNTER — OFFICE VISIT (OUTPATIENT)
Dept: DERMATOLOGY | Facility: CLINIC | Age: 34
End: 2024-09-12
Payer: COMMERCIAL

## 2024-09-12 DIAGNOSIS — Z12.83 SKIN CANCER SCREENING: ICD-10-CM

## 2024-09-12 DIAGNOSIS — D22.9 MULTIPLE MELANOCYTIC NEVI: Primary | ICD-10-CM

## 2024-09-12 PROCEDURE — 99203 OFFICE O/P NEW LOW 30 MIN: CPT | Performed by: DERMATOLOGY

## 2024-09-12 RX ORDER — ASPIRIN 81 MG/1
81 TABLET ORAL DAILY
COMMUNITY

## 2024-09-12 ASSESSMENT — PAIN SCALES - GENERAL: PAINLEVEL: NO PAIN (0)

## 2024-09-12 NOTE — PROGRESS NOTES
[facility] Dermatology Note  Encounter Date: Sep 12, 2024  Office Visit     Dermatology Problem List:  1. Benign nevi, solar lentigines  - FBSE today to establish baseline  - Counseled on ABCDE of melanoma  - Counseled on sun protection  ____________________________________________    Assessment & Plan:    1. FSBE, benign exam with multiple benign appearing nevi.  Patient presented for a FBSE to establish baseline, no specific concerning lesions. On exam he has multiple light brown nevi and a few atypical but benign appearing nevi with light brown edge and central darker pigment.   - Reassurance as to benign findings  - Counseled on ABCDE of melanoma  - Counseled on sun protection    Procedures Performed:   None    Follow-up: prn for new or changing lesions    Staff and Medical Student:     Denise Ceballos MS4    I was present with the medical student who participated in the service and in the documentation.  I have verified the history and personally performed the physical exam and medical decision making.  I agree with the assessment and plan of care as documented in the note.    Clifton Saab MD  Dermatology Attending    ___________________________________________    CC: Skin Check (Yannick is here today for a full body skin examination. He reports that he has never had a skin check in the past. )    HPI:  Mr. Yash Russell is a(n) 34 year old male who presents today as a new patient for FBSE. No personal history of skin cancer, has an aunt with history of skin cancers, is unsure what kind.     Yannick reports that he is interested in a full body exam to establish a baseline. He is very fair and burns easily, has had a blistering sunburn in the past. He wears sunscreen sometimes but not consistently, works as a . Has not noticed any new, changing, painful, bleeding lesions.    Patient is otherwise feeling well, without additional skin concerns.    Labs:  None reviewed.    Physical  "Exam:  Vitals: There were no vitals taken for this visit.  SKIN: Full skin, which includes the head/face, both arms, chest, back, abdomen,both legs, genitalia and/or groin buttocks, digits and/or nails, was examined.  - Mcgowan I  - Tan and brown macules scattered on torso, back, bilateral upper and lower extremities  - \"Fried egg\" macules with light tan pigment with darker central pigment  - No other lesions of concern on areas examined.     Medications:  Current Outpatient Medications   Medication Sig Dispense Refill    aspirin 81 MG EC tablet Take 81 mg by mouth daily.      MODERNA COVID-19 VACCINE 100 MCG/0.5ML injection  (Patient not taking: Reported on 10/3/2023)      NO ACTIVE MEDICATIONS  (Patient not taking: Reported on 10/3/2023)       No current facility-administered medications for this visit.      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 treated with pradaxa for 9 months      CC Referred Self, MD  No address on file on close of this encounter.   "

## 2024-09-12 NOTE — PATIENT INSTRUCTIONS
Thank you for coming in today! Your skin today looks healthy.    Please continue using sun protection and follow up with us as needed for new or changing moles.     Patient Education   Skin Cancer Prevention: Care Instructions  Your Care Instructions     Skin cancer is the abnormal growth of cells in the skin. It usually appears as a growth that changes in color, shape, or size. This can be a sore that does not heal or a change in a mole. Skin cancer is almost always curable when found early and treated. So it is important to see your doctor if you have any of these changes in your skin.  Skin cancer is the most common type of cancer. It often appears on areas of the body that have been exposed to the sun, such as the head, face, neck, back, chest, or shoulders.  Follow-up care is a key part of your treatment and safety. Be sure to make and go to all appointments, and call your doctor if you are having problems. It's also a good idea to know your test results and keep a list of the medicines you take.  How can you care for yourself at home?  Wear a wide-brimmed hat and long sleeves and pants if you are going to be outdoors for a long time.  Avoid the sun between 10 a.m. and 4 p.m., which is the peak time for UV rays.  Wear sunscreen on exposed skin. Make sure to use a broad-spectrum sunscreen that has a sun protection factor (SPF) of 30 or higher. Use it every day, even when it is cloudy.  Do not use tanning booths or sunlamps.  Use lip balm or cream that has sun protection factor (SPF) to protect your lips from getting sunburned.  Wear sunglasses that block UV rays.  When should you call for help?  Watch closely for changes in your health, and be sure to contact your doctor if:    You see a change in your skin, such as a growth or mole that:  Grows bigger. This may happen very slowly.  Changes color.  Changes shape.  Starts to bleed easily.     You have swollen glands in your armpits, groin, or neck.     You do not  "get better as expected.   Where can you learn more?  Go to https://www.[employer].net/patiented  Enter P392 in the search box to learn more about \"Skin Cancer Prevention: Care Instructions.\"  Current as of: November 16, 2023  Content Version: 14.1 2006-2024 [employer].   Care instructions adapted under license by your healthcare professional. If you have questions about a medical condition or this instruction, always ask your healthcare professional. [employer]wise, [employer] disclaims any warranty or liability for your use of this information.       "

## 2024-09-12 NOTE — NURSING NOTE
Dermatology Rooming Note    Yash Russell's goals for this visit include:   Chief Complaint   Patient presents with    Skin Check     Yannick is here today for a full body skin examination. He reports that he has never had a skin check in the past.      Mickey MCCRACKEN CMA

## 2024-09-12 NOTE — LETTER
9/12/2024       RE: Yash Russell  [address]  [place] [address]     Dear Colleague,    Thank you for referring your patient, Yash Russell, to the [facility] [place] [facility]. Please see a copy of my visit note below.    [facility] Dermatology Note  Encounter Date: Sep 12, 2024  Office Visit     Dermatology Problem List:  1. Benign nevi, solar lentigines  - FBSE today to establish baseline  - Counseled on ABCDE of melanoma  - Counseled on sun protection  ____________________________________________    Assessment & Plan:    1. FSBE, benign exam with multiple benign appearing nevi.  Patient presented for a FBSE to establish baseline, no specific concerning lesions. On exam he has multiple light brown nevi and a few atypical but benign appearing nevi with light brown edge and central darker pigment.   - Reassurance as to benign findings  - Counseled on ABCDE of melanoma  - Counseled on sun protection    Procedures Performed:   None    Follow-up: prn for new or changing lesions    Staff and Medical Student:     Denise Ceballos MS4    I was present with the medical student who participated in the service and in the documentation.  I have verified the history and personally performed the physical exam and medical decision making.  I agree with the assessment and plan of care as documented in the note.    Clifton Saab MD  Dermatology Attending    ___________________________________________    CC: Skin Check (Yannick is here today for a full body skin examination. He reports that he has never had a skin check in the past. )    HPI:  Mr. Yash Russell is a(n) 34 year old male who presents today as a new patient for FBSE. No personal history of skin cancer, has an aunt with history of skin cancers, is unsure what kind.     Yannick reports that he is interested in a full body exam to establish a baseline. He is very fair  "and burns easily, has had a blistering sunburn in the past. He wears sunscreen sometimes but not consistently, works as a . Has not noticed any new, changing, painful, bleeding lesions.    Patient is otherwise feeling well, without additional skin concerns.    Labs:  None reviewed.    Physical Exam:  Vitals: There were no vitals taken for this visit.  SKIN: Full skin, which includes the head/face, both arms, chest, back, abdomen,both legs, genitalia and/or groin buttocks, digits and/or nails, was examined.  - Mcgowan I  - Tan and brown macules scattered on torso, back, bilateral upper and lower extremities  - \"Fried egg\" macules with light tan pigment with darker central pigment  - No other lesions of concern on areas examined.     Medications:  Current Outpatient Medications   Medication Sig Dispense Refill     aspirin 81 MG EC tablet Take 81 mg by mouth daily.       MODERNA COVID-19 VACCINE 100 MCG/0.5ML injection  (Patient not taking: Reported on 10/3/2023)       NO ACTIVE MEDICATIONS  (Patient not taking: Reported on 10/3/2023)       No current facility-administered medications for this visit.      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 treated with pradaxa for 9 months      CC Referred Self, MD  No address on file on close of this encounter.       Again, thank you for allowing me to participate in the care of your patient.      Sincerely,    Clifton Saab MD    "

## 2024-09-29 PROBLEM — D22.9 MULTIPLE MELANOCYTIC NEVI: Status: ACTIVE | Noted: 2024-09-29

## 2024-09-29 PROBLEM — Z12.83 SKIN CANCER SCREENING: Status: ACTIVE | Noted: 2024-09-29

## 2024-12-05 ENCOUNTER — PATIENT OUTREACH (OUTPATIENT)
Dept: CARE COORDINATION | Facility: CLINIC | Age: 34
End: 2024-12-05
Payer: COMMERCIAL

## 2024-12-19 ENCOUNTER — PATIENT OUTREACH (OUTPATIENT)
Dept: CARE COORDINATION | Facility: CLINIC | Age: 34
End: 2024-12-19
Payer: COMMERCIAL

## 2025-03-06 DIAGNOSIS — E78.5 HYPERLIPIDEMIA LDL GOAL <100: Primary | ICD-10-CM
